# Patient Record
Sex: FEMALE | Race: BLACK OR AFRICAN AMERICAN | Employment: UNEMPLOYED | URBAN - METROPOLITAN AREA
[De-identification: names, ages, dates, MRNs, and addresses within clinical notes are randomized per-mention and may not be internally consistent; named-entity substitution may affect disease eponyms.]

---

## 2022-07-04 ENCOUNTER — HOSPITAL ENCOUNTER (EMERGENCY)
Age: 27
Discharge: HOME | End: 2022-07-05
Attending: STUDENT IN AN ORGANIZED HEALTH CARE EDUCATION/TRAINING PROGRAM
Payer: COMMERCIAL

## 2022-07-04 ENCOUNTER — APPOINTMENT (OUTPATIENT)
Dept: GENERAL RADIOLOGY | Age: 27
End: 2022-07-04
Payer: COMMERCIAL

## 2022-07-04 VITALS
RESPIRATION RATE: 17 BRPM | OXYGEN SATURATION: 100 % | SYSTOLIC BLOOD PRESSURE: 158 MMHG | DIASTOLIC BLOOD PRESSURE: 87 MMHG | HEART RATE: 93 BPM | HEIGHT: 68 IN | TEMPERATURE: 99 F | BODY MASS INDEX: 44.41 KG/M2 | WEIGHT: 293 LBS

## 2022-07-04 DIAGNOSIS — S89.91XA RIGHT KNEE INJURY, INITIAL ENCOUNTER: Primary | ICD-10-CM

## 2022-07-04 PROCEDURE — 99283 EMERGENCY DEPT VISIT LOW MDM: CPT

## 2022-07-04 PROCEDURE — 73560 X-RAY EXAM OF KNEE 1 OR 2: CPT

## 2022-07-04 PROCEDURE — 6370000000 HC RX 637 (ALT 250 FOR IP): Performed by: PHYSICIAN ASSISTANT

## 2022-07-04 RX ORDER — METHOCARBAMOL 750 MG/1
750 TABLET, FILM COATED ORAL 4 TIMES DAILY PRN
Qty: 20 TABLET | Refills: 0 | Status: SHIPPED | OUTPATIENT
Start: 2022-07-04

## 2022-07-04 RX ORDER — IBUPROFEN 800 MG/1
800 TABLET ORAL EVERY 8 HOURS PRN
Qty: 20 TABLET | Refills: 0 | Status: SHIPPED | OUTPATIENT
Start: 2022-07-04

## 2022-07-04 RX ORDER — METHOCARBAMOL 500 MG/1
750 TABLET, FILM COATED ORAL ONCE
Status: COMPLETED | OUTPATIENT
Start: 2022-07-04 | End: 2022-07-04

## 2022-07-04 RX ORDER — IBUPROFEN 400 MG/1
800 TABLET ORAL ONCE
Status: COMPLETED | OUTPATIENT
Start: 2022-07-04 | End: 2022-07-04

## 2022-07-04 RX ADMIN — IBUPROFEN 800 MG: 400 TABLET, FILM COATED ORAL at 22:50

## 2022-07-04 RX ADMIN — METHOCARBAMOL 750 MG: 500 TABLET ORAL at 22:50

## 2022-07-04 ASSESSMENT — PAIN SCALES - GENERAL
PAINLEVEL_OUTOF10: 8
PAINLEVEL_OUTOF10: 8

## 2022-07-04 ASSESSMENT — PAIN DESCRIPTION - PAIN TYPE: TYPE: ACUTE PAIN

## 2022-07-04 ASSESSMENT — PAIN DESCRIPTION - DESCRIPTORS: DESCRIPTORS: SHARP

## 2022-07-04 ASSESSMENT — PAIN DESCRIPTION - LOCATION
LOCATION: KNEE
LOCATION: KNEE

## 2022-07-04 ASSESSMENT — PAIN DESCRIPTION - FREQUENCY: FREQUENCY: CONTINUOUS

## 2022-07-04 ASSESSMENT — PAIN DESCRIPTION - ORIENTATION
ORIENTATION: RIGHT
ORIENTATION: RIGHT

## 2022-07-04 ASSESSMENT — PAIN - FUNCTIONAL ASSESSMENT
PAIN_FUNCTIONAL_ASSESSMENT: 0-10
PAIN_FUNCTIONAL_ASSESSMENT: PREVENTS OR INTERFERES SOME ACTIVE ACTIVITIES AND ADLS

## 2022-07-04 ASSESSMENT — PAIN DESCRIPTION - ONSET: ONSET: PROGRESSIVE

## 2022-07-05 NOTE — ED PROVIDER NOTES
[address] [facility]        Pt Name: Krupa Guzman  MRN: [id number]  [place] 1995  Date of evaluation: 2022  Provider: ISABELLA Short  PCP: Matteo Biswas MD  Note Started: 11:10 PM EDT     The ED Attending Physician was available for consultation but did not see or evaluate this patient. CHIEF COMPLAINT       Chief Complaint   Patient presents with    Knee Pain     right knee pain after slipping right leg was caught underneath her when she fell       HISTORY OF PRESENT ILLNESS   (Location, Timing/Onset, Context/Setting, Quality, Duration, Modifying Factors, Severity, Associated Signs and Symptoms)  Note limiting factors. Chief Complaint: Severe right knee pain    Krupa Guzman is a 32 y.o. female who presents via EMS with complaint of severe right knee pain. She says she was watching fireworks today and at some point fell to the ground, she says her right knee twisted beneath her, but there was no significant traumatic impact. She says the pain in the knee was severe, and she is now unable to bear weight on the knee or even bend. She says it is even painful at rest but much more when trying to move it. Denies any lacerations or any swelling. Says it is a tingling sensation in the lower leg but not complete numbness. Denies any prior history of problems with the knee. Denies injury to any other parts of the body. Nursing Notes were all reviewed and agreed with or any disagreements were addressed in the HPI. REVIEW OF SYSTEMS    (2-9 systems for level 4, 10 or more for level 5)     Review of Systems    Positives and pertinent negatives as per HPI.      PAST MEDICAL HISTORY     Past Medical History:   Diagnosis Date    Diabetes mellitus ([address]     Endometriosis     Hypertension        SURGICAL HISTORY     Past Surgical History:   Procedure Laterality Date    INDUCED  CURRENTMEDICATIONS       Previous Medications    ACETAMINOPHEN (TYLENOL) 325 MG TABLET    Take 650 mg by mouth every 6 hours as needed for Pain    FAMOTIDINE (PEPCID) 20 MG TABLET    Take 1 tablet by mouth 2 times daily    HYDROCHLOROTHIAZIDE (MICROZIDE) 12.5 MG CAPSULE    Take 25 mg by mouth every morning     METFORMIN (GLUCOPHAGE-XR) 500 MG EXTENDED RELEASE TABLET    Take 2 tablets by mouth daily (with breakfast)    ONDANSETRON (ZOFRAN ODT) 4 MG DISINTEGRATING TABLET    Take 1-2 tablets by mouth every 8 hours as needed for Nausea May Sub regular tablet (non-ODT) if insurance does not cover ODT. ONDANSETRON (ZOFRAN) 4 MG TABLET    Take 4 mg by mouth every 12 hours as needed for Nausea or Vomiting    PAROXETINE (PAXIL) 20 MG TABLET    Take 20 mg by mouth every morning       ALLERGIES     Bee venom    FAMILYHISTORY     History reviewed. No pertinent family history. SOCIAL HISTORY       Social History     Tobacco Use    Smoking status: Never Smoker    Smokeless tobacco: Never Used   Substance Use Topics    Alcohol use: Yes    Drug use: Not on file       SCREENINGS    Fabio Coma Scale  Eye Opening: Spontaneous  Best Verbal Response: Oriented  Best Motor Response: Obeys commands  [place] Coma Scale Score: 15      PHYSICAL EXAM    (up to 7 for level 4, 8 or more for level 5)     ED Triage Vitals   BP Temp Temp Source Heart Rate Resp SpO2 Height Weight   07/04/22 2238 07/04/22 2232 07/04/22 2232 07/04/22 2238 07/04/22 2232 07/04/22 2232 07/04/22 2233 07/04/22 2233   (!) 158/87 99 °F (37.2 °C) Oral 93 17 100 % 5' 8\" (1.727 m) (!) 358 lb 4 oz (162.5 kg)       Physical Exam  Vitals and nursing note reviewed. Constitutional:       General: She is not in acute distress. Appearance: Normal appearance. She is obese. She is not ill-appearing. HENT:      Head: Normocephalic and atraumatic. Nose: Nose normal.   Eyes:      General:         Right eye: No discharge. Left eye: No discharge. Pulmonary:      Effort: Pulmonary effort is normal. No respiratory distress. Musculoskeletal:         General: Normal range of motion. Cervical back: Normal range of motion. Comments: Significant tenderness to light palpation diffusely throughout the area of the right knee, with patient tearful in pain with any attempts of range of motion to the right knee. No evident swelling, bruising or laceration. 2+ dorsalis pedis pulse on the right. Sensation to light touch grossly intact and capillary refill <3 seconds in the digits of the right lower extremity. Skin:     General: Skin is warm and dry. Neurological:      General: No focal deficit present. Mental Status: She is alert and oriented to person, place, and time. Psychiatric:         Mood and Affect: Mood normal.         Behavior: Behavior normal.         DIAGNOSTIC RESULTS   LABS:    Labs Reviewed - No data to display    When ordered only abnormal lab results are displayed. All other labs were within normal range or not returned as of this dictation. EKG: When ordered, EKG's are interpreted by the Emergency Department Physician in the absence of a cardiologist.  Please see their note for interpretation of EKG. RADIOLOGY:   Non-plain film images such as CT, Ultrasound and MRI are read by the radiologist. Plain radiographic images are visualized and preliminarily interpreted by the ED Provider with the below findings:    Interpretation per the Radiologist below, if available at the time of this note:    XR KNEE RIGHT (1-2 VIEWS)   Final Result   Small suprapatellar effusion with no acute bony abnormality. CONSULTS:  None    PROCEDURES   Unless otherwise noted below, none.      Procedures    EMERGENCY DEPARTMENT COURSE and DIFFERENTIAL DIAGNOSIS/MDM:   Vitals:    Vitals:    07/04/22 2232 07/04/22 2233 07/04/22 2238   BP:   (!) 158/87   Pulse:   93   Resp: 17     Temp: 99 °F (37.2 °C)     TempSrc: Oral     SpO2: 100% Weight:  (!) 358 lb 4 oz (162.5 kg)    Height:  5' 8\" (1.727 m)        Patient was given the following medications:  Medications   ibuprofen (ADVIL;MOTRIN) tablet 800 mg (800 mg Oral Given 7/4/22 2250)   methocarbamol (ROBAXIN) tablet 750 mg (750 mg Oral Given 7/4/22 2250)           Is this patient to be included in the SEP-1 Core Measure due to severe sepsis or septic shock? No   Exclusion criteria - the patient is NOT to be included for SEP-1 Core Measure due to: Infection is not suspected    Patient was in significant pain in the ED. Good neurovascular status in the leg. X-ray showed a small effusion but no acute bony abnormality, however there is suspicion for significant ligament injury. No indication for hospital admission at this time. Patient was given a knee immobilizer and crutches in the ED. She declined narcotic medication. She will be discharged with prescriptions for anti-inflammatory medication and a muscle relaxant, and she will be given referral for orthopedic follow-up care. The patient verbalized understanding and agreement with this plan of care. The patient was advised to return to the emergency department if symptoms should significantly worsen or if new and concerning symptoms should appear. I estimate there is LOW risk for UNSTABLE FRACTURE, COMPARTMENT SYNDROME, DEEP VENOUS THROMBOSIS, SEPTIC ARTHRITIS, NEUROVASCULAR COMPROMISE, or TENDON/LIGAMENT RUPTURE, thus I consider the discharge disposition reasonable. CRITICAL CARE TIME   None    FINAL IMPRESSION      1.  Right knee injury, initial encounter          DISPOSITION/PLAN   DISPOSITION Decision To Discharge 07/04/2022 11:35:44 PM      PATIENT REFERRED TO:  Melissa Olmstead MD  [address] [address]  [phone]    Schedule an appointment as soon as possible for a visit   for orthopedic follow-up care      DISCHARGE MEDICATIONS:  New Prescriptions    IBUPROFEN (ADVIL;MOTRIN) 800 MG TABLET Take 1 tablet by mouth every 8 hours as needed for Pain    METHOCARBAMOL (ROBAXIN-750) 750 MG TABLET    Take 1 tablet by mouth 4 times daily as needed (pain)       DISCONTINUED MEDICATIONS:  Discontinued Medications    No medications on file            (Please note that portions of this note were completed with a voice recognition program.  Efforts were made to edit the dictations but occasionally words are mis-transcribed.)    ISABELLA Romero (electronically signed)       Roman Dillon, [address]  07/04/22 3061

## 2022-07-06 ENCOUNTER — TELEPHONE (OUTPATIENT)
Dept: ORTHOPEDIC SURGERY | Age: 27
End: 2022-07-06

## 2022-07-06 ENCOUNTER — OFFICE VISIT (OUTPATIENT)
Dept: ORTHOPEDIC SURGERY | Age: 27
End: 2022-07-06
Payer: COMMERCIAL

## 2022-07-06 VITALS — BODY MASS INDEX: 44.41 KG/M2 | WEIGHT: 293 LBS | HEIGHT: 68 IN

## 2022-07-06 DIAGNOSIS — S83.412A SPRAIN OF MEDIAL COLLATERAL LIGAMENT OF LEFT KNEE, INITIAL ENCOUNTER: ICD-10-CM

## 2022-07-06 DIAGNOSIS — S83.241A ACUTE MEDIAL MENISCUS TEAR OF RIGHT KNEE, INITIAL ENCOUNTER: Primary | ICD-10-CM

## 2022-07-06 PROCEDURE — G8427 DOCREV CUR MEDS BY ELIG CLIN: HCPCS | Performed by: ORTHOPAEDIC SURGERY

## 2022-07-06 PROCEDURE — 99203 OFFICE O/P NEW LOW 30 MIN: CPT | Performed by: ORTHOPAEDIC SURGERY

## 2022-07-06 PROCEDURE — G8417 CALC BMI ABV UP PARAM F/U: HCPCS | Performed by: ORTHOPAEDIC SURGERY

## 2022-07-06 NOTE — PROGRESS NOTES
Dominic Salas  [id number]  July 6, 2022    Chief Complaint   Patient presents with    Knee Pain     Right       History: The patient is a 60-year-old female who is here for evaluation of her right knee. The patient reportedly was walking on wet grass and she slipped and fell and her right leg went behind her. She attempted to get up and then fell again. She immediately noted right knee pain and swelling. The injury occurred on 7/4/2022. She has no prior history of knee injury. She has been taking ibuprofen on a regular basis. She has been wearing her knee immobilizer. This is a consult from [facility] for right knee pain and swelling. The patient's  past medical history, medications, allergies,  family history, social history, and have been reviewed, and dated and are recorded in the chart. Pertinent items are noted in HPI. Review of systems reviewed from Pertinent History Form dated on 7/6/22 and available in the patient's chart under the Media tab. Vitals:  Ht 5' 8\" (1.727 m)   Wt (!) 358 lb (162.4 kg)   LMP 06/19/2022   BMI 54.43 kg/m²     Physical: Ms. Dominic Salas appears well, she is in no apparent distress, she demonstrates appropriate mood & affect. She is morbidly obese. She is alert and oriented to person, place and time. Examination of the right lower extremity reveals no pain with range of motion of the hip. She has generalized tenderness to palpation about the joint line of the right knee. Range of motion is from -3 degrees to 90 degrees. Strength is 4+ to 5/5 for all muscle groups about the right knee. There is patellofemoral crepitus with range of motion of the right knee. Varus and valgus stressing of the knees reveals no evidence of instability. The patient does have moderate pain in the right knee with valgus stressing. There is a medium effusion in the right knee. Anterior drawer and Lachman are negative bilaterally.   Palpation of the knee reveals an intact patellar tendon and quadriceps tendon. She is able to perform a straight leg raise. Examination of the skin reveals no rashes, ulceration, or lesion, bilaterally in the lower extremities. Sensation to both lower extremities is grossly intact. Exam of both feet reveals pedal pulses intact and brisk cap refill. Patient is able to dorsiflex and wiggle all toes. Deep tendon reflexes of the lower extremities are normal and symmetric. X-rays: 2 views of the right knee obtained in the emergency room were extensively reviewed. There is no evidence of fracture or dislocation. There are no lytic or blastic lesions within the bone. Impression: Right knee sprain rule out medial meniscal tear    Plan: At this time, we will obtain an MRI scan of the right knee. I am concerned about the medial meniscus and medial collateral ligament. The patient was encouraged to work on light range of motion. She will ice the knee is much as possible. She may take ibuprofen on a regular basis. She will follow-up with me after the MRI scan is completed. No orders of the defined types were placed in this encounter.

## 2022-07-06 NOTE — TELEPHONE ENCOUNTER
Aracelis Zelaya  P [facility] Clinical Support    MRI APPROVED Alessandra Amaya # [id number] - VALID TO 09/04/22 - @ [facility]     Was placed at [facility] due to her insurance preferring free standing facility. We recieved Ins Auth for MRI. If MRI is for [facility], order/auth has been faxed. Patient will call to schedule the MRI, then call our office back to make a follow up appt, to go over the results. I left a detailed VM letting pt know.

## 2022-07-13 ENCOUNTER — TELEPHONE (OUTPATIENT)
Dept: ORTHOPEDIC SURGERY | Age: 27
End: 2022-07-13

## 2022-07-13 NOTE — TELEPHONE ENCOUNTER
I spoke with Michelle Moses at [facility] and let her know we only received the sagittal images of MRI. She will push all images.

## 2022-07-14 ENCOUNTER — OFFICE VISIT (OUTPATIENT)
Dept: ORTHOPEDIC SURGERY | Age: 27
End: 2022-07-14
Payer: COMMERCIAL

## 2022-07-14 VITALS — WEIGHT: 293 LBS | BODY MASS INDEX: 44.41 KG/M2 | RESPIRATION RATE: 16 BRPM | HEIGHT: 68 IN

## 2022-07-14 DIAGNOSIS — S83.511A RUPTURE OF ANTERIOR CRUCIATE LIGAMENT OF RIGHT KNEE, INITIAL ENCOUNTER: Primary | ICD-10-CM

## 2022-07-14 DIAGNOSIS — S83.281A ACUTE LATERAL MENISCAL TEAR, RIGHT, INITIAL ENCOUNTER: ICD-10-CM

## 2022-07-14 DIAGNOSIS — S83.241A ACUTE MEDIAL MENISCAL TEAR, RIGHT, INITIAL ENCOUNTER: ICD-10-CM

## 2022-07-14 PROCEDURE — 1036F TOBACCO NON-USER: CPT | Performed by: ORTHOPAEDIC SURGERY

## 2022-07-14 PROCEDURE — G8417 CALC BMI ABV UP PARAM F/U: HCPCS | Performed by: ORTHOPAEDIC SURGERY

## 2022-07-14 PROCEDURE — 99214 OFFICE O/P EST MOD 30 MIN: CPT | Performed by: ORTHOPAEDIC SURGERY

## 2022-07-14 PROCEDURE — G8428 CUR MEDS NOT DOCUMENT: HCPCS | Performed by: ORTHOPAEDIC SURGERY

## 2022-07-14 NOTE — PROGRESS NOTES
Manuela Zavala  [id number]  July 14, 2022    Chief Complaint   Patient presents with    Follow-up     Right knee, MRI results. History: The patient is a 42-year-old female who is here for evaluation of her right knee. The patient reportedly was walking on wet grass and she slipped and fell and her right leg went behind her. She attempted to get up and then fell again. She is here to review her MRI results her symptoms have improved significantly. She is having much less pain. The patient's  past medical history, medications, allergies,  family history, social history, and have been reviewed, and dated and are recorded in the chart. Pertinent items are noted in HPI. Review of systems reviewed from Pertinent History Form dated on 7/6/22 and available in the patient's chart under the Media tab. Vitals:  Resp 16   Ht 5' 8\" (1.727 m)   Wt (!) 358 lb (162.4 kg) Comment: patient in to much pain to stand on leg to long  LMP 06/19/2022   BMI 54.43 kg/m²     Physical: Ms. Manuela Zavala appears well, she is in no apparent distress, she demonstrates appropriate mood & affect. She is morbidly obese. She is alert and oriented to person, place and time. Examination of the right lower extremity reveals no pain with range of motion of the hip. She has generalized tenderness to palpation about the medial and lateral joint line. Range of motion is from -3 degrees to 100 degrees. Strength is 4+ to 5/5 for all muscle groups about the right knee. There is patellofemoral crepitus with range of motion of the right knee. Varus and valgus stressing of the knees reveals no evidence of instability. The patient does have moderate pain in the right knee with valgus stressing. There is a medium effusion in the right knee. Anterior drawer and Lachman are negative bilaterally. Palpation of the knee reveals an intact patellar tendon and quadriceps tendon.   She is able to perform a straight leg raise.  Examination of the skin reveals no rashes, ulceration, or lesion, bilaterally in the lower extremities. Sensation to both lower extremities is grossly intact. Exam of both feet reveals pedal pulses intact and brisk cap refill. Patient is able to dorsiflex and wiggle all toes. Deep tendon reflexes of the lower extremities are normal and symmetric. X-rays: MRI of the right knee was extensively reviewed. The patient has evidence of a complete rupture of the ACL. There is also evidence of a complex medial meniscal tear involving the body and posterior horn. There is a vertical tear involving the lateral meniscus. Impression: Right knee ACL tear #2 right knee medial meniscal tear #3 right knee lateral meniscal tear      Plan: At this time, we will attempt to treat this nonsurgically. We will get her into a therapy program.  She will work on range of motion and strengthening. She will concentrate on hamstring strengthening. She may weight-bear as tolerated. She will gradually discontinue crutches. She will follow-up with me in 2 to 3 weeks. If she continues to be severely painful, we certainly could consider arthroscopy to address the meniscus tears. I do not feel she is a great surgical candidate for ACL reconstruction. No orders of the defined types were placed in this encounter.

## 2022-07-18 ENCOUNTER — HOSPITAL ENCOUNTER (OUTPATIENT)
Dept: PHYSICAL THERAPY | Age: 27
Setting detail: THERAPIES SERIES
Discharge: HOME | End: 2022-07-18
Payer: COMMERCIAL

## 2022-07-18 PROCEDURE — 97016 VASOPNEUMATIC DEVICE THERAPY: CPT

## 2022-07-18 PROCEDURE — 97530 THERAPEUTIC ACTIVITIES: CPT

## 2022-07-18 PROCEDURE — 97110 THERAPEUTIC EXERCISES: CPT

## 2022-07-18 PROCEDURE — 97161 PT EVAL LOW COMPLEX 20 MIN: CPT

## 2022-07-18 NOTE — PLAN OF CARE
[facility], [facility]  [address] [place], [facility]  Phone: [phone]   Fax:     [phone]                                                       Physical Therapy Certification    Dear Referring Provider (secondary): Carmen Hayes MD    We had the pleasure of evaluating the following patient for physical therapy services at [facility]. A summary of our findings can be found in the initial assessment below. This includes our plan of care. If you have any questions or concerns regarding these findings, please do not hesitate to contact me at the office phone number checked above.   Thank you for the referral.       Physician Signature:_______________________________Date:__________________  By signing above (or electronic signature), therapists plan is approved by physician              Patient: Vassie Lanes   : 1995   MRN: [id number]  Referring Physician: Referring Provider (secondary): Carmen Hayes MD      Evaluation Date: 2022      Medical Diagnosis Information:  Diagnosis: I59.461E (ICD-10-CM) - Rupture of anterior cruciate ligament of right knee, initial encounter  S83.241A (ICD-10-CM) - Acute medial meniscal tear, right, initial encounter  S83.281A (ICD-10-CM) - Acute lateral meniscal tear, right, initial encounter   Treatment Diagnosis: subacute knee sprain, limited mobility, function                                         Insurance information:       Precautions/ Contra-indications:   Latex Allergy:  [x]NO      []YES  Preferred Language for Healthcare:   [x]English       []other:    C-SSRS Triggered by Intake questionnaire (Past 2 wk assessment ):   [x] No, Questionnaire did not trigger screening.   [] Yes, Patient intake triggered C-SSRS Screening      [] C-SSRS Screening completed  [] PCP notified via Epic SUBJECTIVE: Patient stated complaint: Pt here for evaluation R knee sprain which occurred on 7/4/22. She slipped and fell on wet grass with her knee going under/ behind her. She tried to get up, but felt a few pops. She had ortho visit and MRI showed ACL and med/ lat meniscus tears. She has f/u w/ ortho in appro x3 wks. At this time, the plan is to treat conservatively. She just finished her masters in [address] [place] [address] and also owns a [employer]. 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 Today she reports using bilat crutches with toe touch weight bearing, no brace in place. She states she was given an immobilizer, but has not been using it for the past week because it wouldn't stay put and she is eager to regain her function. Knee MRI:   CONCLUSION:   1. Acute midsubstance ACL tear. Portions of the ACL are buckled on itself anteriorly, which may    give rise to catching or locking clinically. 2. 3-4 cm trizonal tear posterior horn-body junction medial meniscus. 3. Vertical tear posterior horn lateral meniscus. Wrisberg rip component present. Displaced    meniscal root fragment into the notch favored over prominent intrameniscal ligament mimicking    displaced flap at the root. 4. Low-grade MCL and fibular collateral ligament sprain. Diffuse muscle and myofascial and    capsular sprain surrounding the knee. This is most conspicuous posteriorly and    anterolaterally. 5. Complex effusion. Hemarthrosis present. Coagulated blood products noted within the joint. 6. At least grade 2-3 chondral irregularity central and lateral patellar cartilage.      Relevant Medical History:   Functional Scale/Score: FOTO = 29     Pain Scale: 1-5/10  Easing factors: rest, keeping knee bent, avoiding WBing  Provocative factors: walking, moving knee too much     Type: [x]Constant   []Intermittent (I10)  []Hyperlipidemia (E78.5)  []Angina pectoris (I20)  []Atherosclerosis (I70)  []CVA Musculoskeletal conditions   []Disc pathology   []Congenital spine pathologies   []Prior surgical intervention  []Osteoporosis (M81.8)  []Osteopenia (M85.8)   Endocrine conditions   []Hypothyroid (E03.9)  []Hyperthyroid Gastrointestinal conditions   []Constipation (G32.65)   Metabolic conditions   []Morbid obesity (E66.01)  []Diabetes type 1(E10.65) or 2 (E11.65)   []Neuropathy (G60.9)     Pulmonary conditions   []Asthma (J45)  []Coughing   []COPD (J44.9)   Psychological Disorders  []Anxiety (F41.9)  []Depression (F32.9)   []Other:   []Other:          Barriers to/and or personal factors that will affect rehab potential:              []Age  []Sex    []Smoker              []Motivation/Lack of Motivation                        [x]Co-Morbidities              []Cognitive Function, education/learning barriers              [x]Environmental, home barriers              [x]profession/work barriers  []past PT/medical experience  []other:  Justification:     Falls Risk Assessment (30 days):   [x] Falls Risk assessed and no intervention required.   [] Falls Risk assessed and Patient requires intervention due to being higher risk   TUG score (>12s at risk):     [] Falls education provided, including         ASSESSMENT:   Functional Impairments:     []Noted lumbar/proximal hip/LE hypomobility   [x]Decreased LE functional ROM   [x]Decreased core/proximal hip strength and neuromuscular control   [x]Decreased LE functional strength   [x]Reduced balance/proprioceptive control   []other:      Functional Activity Limitations (from functional questionnaire and intake)   [x]Reduced ability to tolerate prolonged functional positions   [x]Reduced ability or difficulty with changes of positions or transfers between positions   []Reduced ability to maintain good posture and demonstrate good body mechanics with sitting, bending, and lifting   []Reduced ability to sleep   [x] Reduced ability or tolerance with driving and/or computer work   [x]Reduced ability to perform lifting, carrying tasks   [x]Reduced ability to squat   []Reduced ability to forward bend   [x]Reduced ability to ambulate prolonged functional periods/distances/surfaces   [x]Reduced ability to ascend/descend stairs   [x]Reduced ability to run, hop or jump   []other:     Participation Restrictions   []Reduced participation in self care activities   [x]Reduced participation in home management activities   [x]Reduced participation in work activities   [x]Reduced participation in social activities. []Reduced participation in sport activities. Classification :    []Signs/symptoms consistent with post-surgical status including decreased ROM, strength and function.    [x]Signs/symptoms consistent with joint sprain/strain   []Signs/symptoms consistent with patella-femoral syndrome   []Signs/symptoms consistent with knee OA/hip OA   [x]Signs/symptoms consistent with internal derangement of knee/Hip   [x]Signs/symptoms consistent with functional hip weakness/NMR control      []Signs/symptoms consistent with tendinitis/tendinosis    []signs/symptoms consistent with pathology which may benefit from Dry needling      []other:      Prognosis/Rehab Potential:      []Excellent   [x]Good    []Fair   []Poor    Tolerance of evaluation/treatment:    []Excellent   [x]Good    []Fair   []Poor    Physical Therapy Evaluation Complexity Justification  [x] A history of present problem with:  [] no personal factors and/or comorbidities that impact the plan of care;  [x]1-2 personal factors and/or comorbidities that impact the plan of care  []3 personal factors and/or comorbidities that impact the plan of care  [x] An examination of body systems using standardized tests and measures addressing any of the following: body structures and functions (impairments), activity limitations, and/or participation restrictions;:  [x] a total of 1-2 or more elements   [] a total of 3 or more elements   [] a total of 4 or more elements   [x] A clinical presentation with:  [] stable and/or uncomplicated characteristics   [x] evolving clinical presentation with changing characteristics  [] unstable and unpredictable characteristics;   [x] Clinical decision making of [] low, [] moderate, [] high complexity using standardized patient assessment instrument and/or measurable assessment of functional outcome. [x] EVAL (LOW) 86277 (typically 20 minutes face-to-face)  [] EVAL (MOD) 27786 (typically 30 minutes face-to-face)  [] EVAL (HIGH) 20944 (typically 45 minutes face-to-face)  [] RE-EVAL     PLAN:  Frequency/Duration:  up to 2 days per week for up to 6-12 Weeks:  Interventions:  [x]  Therapeutic exercise including: strength training, ROM, for Lower extremity and core   [x]  NMR activation and proprioception for LE, Glutes and Core   [x]  Manual therapy as indicated for LE, Hip and spine to include: Dry Needling/IASTM, STM, PROM, Gr I-IV mobilizations, manipulation. [x] Modalities as needed that may include: thermal agents, E-stim, Biofeedback, US, iontophoresis as indicated  [x] Patient education on joint protection, postural re-education, activity modification, progression of HEP. [x] Aquatic exercise including: strength training, ROM, and balance for Lower extremity and core     HEP instruction:   7/18: QS, AAROM: supine and seated, ST TKE, gait practice w/ bilat crutches and knee brace    GOALS:  Patient stated goal: able to ambulate community distances w/o AD. [] Progressing: [] Met: [] Not Met: [] Adjusted    Therapist goals for Patient:   Short Term Goals: To be achieved in: 2 weeks  1. Independent in HEP and progression per patient tolerance, in order to prevent re-injury. [] Progressing: [] Met: [] Not Met: [] Adjusted  2.  Patient will have a decrease in pain to facilitate improvement in movement, function, and ADLs as indicated by Functional Deficits. [] Progressing: [] Met: [] Not Met: [] Adjusted    Long Term Goals: To be achieved in: up to 12 weeks  1. Increase FOTO functional outcome score from 29 to 61 to assist with reaching prior level of function. [] Progressing: [] Met: [] Not Met: [] Adjusted  2. Patient will demonstrate increased AROM to symmetrical to allow for proper joint functioning as indicated by patients Functional Deficits. [] Progressing: [] Met: [] Not Met: [] Adjusted  3. Patient will demonstrate an increase in Strength to good proximal hip strength and control, within 5lb HHD in LE to allow for proper functional mobility as indicated by patients Functional Deficits. [] Progressing: [] Met: [] Not Met: [] Adjusted  4. Patient will return to all functional activities without increased symptoms or restriction. [] Progressing: [] Met: [] Not Met: [] Adjusted  5. Able to work full time w/o knee giving way. [] Progressing: [] Met: [] Not Met: [] Adjusted     Electronically signed by: Kayce Bright PT , DPT, OCS #[id number]          Note: If patient does not return for scheduled/recommended follow up visits, this note will serve as a discharge from care along with the most recent update on progress.

## 2022-07-18 NOTE — FLOWSHEET NOTE
[facility] - [facility], [facility]  [address] [facility], [facility]  Phone: [phone]   Fax:     [phone]      Physical Therapy Treatment Note/ Progress Report:     Date:  2022    Patient Name:  Mee Espinoza    :  1995  MRN: [id number]    Pertinent Medical History:     Medical/Treatment Diagnosis Information:  Diagnosis: P76.423L (ICD-10-CM) - Rupture of anterior cruciate ligament of right knee, initial encounter  S83.241A (ICD-10-CM) - Acute medial meniscal tear, right, initial encounter  S83.281A (ICD-10-CM) - Acute lateral meniscal tear, right, initial encounter  Treatment Diagnosis: subacute knee sprain, limited mobility, function    Insurance/Certification information:     Physician Information:  Referring Provider (secondary): Lissy Torres MD  Plan of care signed (Y/N):     Date of Patient follow up with Physician:      Progress Report: []  Yes  [x]  No     Date Range for reporting period:  Beginnin2022  Ending:      Progress report due (10 Rx/or 30 days whichever is less):      Recertification due (POC duration/ or 90 days whichever is less):      Visit # POC/Insurance Allowable Auth Needed   1 caresource []Yes   []No     Latex Allergy:  [x]NO      []YES  Preferred Language for Healthcare:   [x]English       []Other:    Functional Scale:       Date assessed: at eval  Test:FOTO  Score:    Pain level:  /10     History of Injury: Pt here for evaluation R knee sprain which occurred on 22. She slipped and fell on wet grass with her knee going under/ behind her. She tried to get up, but felt a few pops. She had ortho visit and MRI showed ACL and med/ lat meniscus tears. She has f/u w/ ortho in appro x3 wks. At this time, the plan is to treat conservatively. 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 Today she reports using bilat crutches with toe touch weight bearing, no brace in place. She states she was given an immobilizer, but has not been using it for the past week because it wouldn't stay put and she is eager to regain her function. Knee MRI:   CONCLUSION:   1. Acute midsubstance ACL tear. Portions of the ACL are buckled on itself anteriorly, which may    give rise to catching or locking clinically. 2. 3-4 cm trizonal tear posterior horn-body junction medial meniscus. 3. Vertical tear posterior horn lateral meniscus. Wrisberg rip component present. Displaced   meniscal root fragment into the notch favored over prominent intrameniscal ligament mimicking   displaced flap at the root. 4. Low-grade MCL and fibular collateral ligament sprain. Diffuse muscle and myofascial and   capsular sprain surrounding the knee. This is most conspicuous posteriorly and   anterolaterally. 5. Complex effusion. Hemarthrosis present. Coagulated blood products noted within the joint. 6. At least grade 2-3 chondral irregularity central and lateral patellar cartilage. SUBJECTIVE:  See eval    OBJECTIVE:  Observation:   Test measurements:      RESTRICTIONS/PRECAUTIONS: acute ACL + meniscus tears    Exercises/Interventions:     Therapeutic Ex (07405)   Min: 8' Reps/Resistance Notes/CUES        AAROM Seated x 8 May try supine heel slides w/ belt in future   Heel prop >pt edu         Hip Abd > Sidely? Hip ext > Prone?    LAQ >    Knee ext isometric > future        ST heel toe raise >pt edu              Pt edu HEP review    Therapeutic Activity (01154) Min: 15'     Pt edu Pathology, prognosis, early rehab goals    Gait Gait w/ bilat crutches Cues for foot flat, TKE, step thru pattern                       NMR re-education (19993)  Min: 4'  CUES NEEDED        QS Towel roll under knee; 5s x 8 SLR >future                        Manual Intervention (69304) Min: 6'     Knee manual Knee PROM flex/ext Add hip PROM, HS str per kathleen                       Modalities  Min: 10'     vaso 10' Leg on bolster; low compression   BFR > Future after a few sessions   NMR estim >future      Other Therapeutic Activities: Pt was educated on PT POC, Diagnosis, Prognosis, pathomechanics as well as frequency and duration of scheduling future physical therapy appointments. Time was also taken on this day to answer all patient questions and participation in PT. Reviewed appointment policy in detail with patient and patient verbalized understanding. Home Exercise Program: Patient was instructed in the following for HEP:       7/18: QS, AAROM: supine and seated, ST TKE, gait practice w/ bilat crutches and knee brace      . Patient verbalized/demonstrated understanding and was issued written handout. Therapeutic Exercise and NMR EXR  [x] (31729) Provided verbal/tactile cueing for activities related to strengthening, flexibility, endurance, ROM for improvements in LE, proximal hip, and core control with self care, mobility, lifting, ambulation. [x] (81736) Provided verbal/tactile cueing for activities related to improving balance, coordination, kinesthetic sense, posture, motor skill, proprioception  to assist with LE, proximal hip, and core control in self care, mobility, lifting, ambulation and eccentric single leg control. [address] and Therapeutic Activities:    [x] (01396 or 41174) Provided verbal/tactile cueing for activities related to improving balance, coordination, kinesthetic sense, posture, motor skill, proprioception and motor activation to allow for proper function of core, proximal hip and LE with self care and ADLs and functional mobility.    [x] (05483) Gait Re-education- Provided training and instruction to the patient for proper LE, core and proximal hip recruitment and positioning and eccentric body weight control with ambulation re-education including up and down stairs     Home Exercise Program:    [x] (30325) Reviewed/Progressed HEP activities related to strengthening, flexibility, endurance, ROM of core, proximal hip and LE for functional self-care, mobility, lifting and ambulation/stair navigation   [x] (32684)Reviewed/Progressed HEP activities related to improving balance, coordination, kinesthetic sense, posture, motor skill, proprioception of core, proximal hip and LE for self care, mobility, lifting, and ambulation/stair navigation      Manual Treatments:  PROM / STM / Oscillations-Mobs:  G-I, II, III, IV (PA's, Inf., Post.)  [x] (59280) Provided manual therapy to mobilize LE, proximal hip and/or LS spine soft tissue/joints for the purpose of modulating pain, promoting relaxation,  increasing ROM, reducing/eliminating soft tissue swelling/inflammation/restriction, improving soft tissue extensibility and allowing for proper ROM for normal function with self care, mobility, lifting and ambulation. Approval Dates:  CPT Code Units Approved Units Used  Date Updated:                     Charges:  Timed Code Treatment Minutes: 33   Total Treatment Minutes: 53      [x] EVAL (LOW) 67454 (typically 20 minutes face-to-face)  [] EVAL (MOD) 62632 (typically 30 minutes face-to-face)  [] EVAL (HIGH) 15248 (typically 45 minutes face-to-face)  [] RE-EVAL     [x] HH(40541) x     [] Dry needle 1 or 2 Muscles (99508)  [] NMR (83353) x     [] Dry needle 3+ Muscles (31817)  [] Manual (70733) x     [] Ultrasound (14549) x  [x] TA (00036) x     [] Mech Traction (17847)  [] ES(attended) (29233)     [] ES (un) (30094):   [x] Vasopump (96438) [] Ionto (59203)   [] Other:    Khoi Gaines stated goal: able to ambulate community distances w/o AD. [] Progressing: [] Met: [] Not Met: [] Adjusted    Therapist goals for Patient:   Short Term Goals: To be achieved in: 2 weeks  1.  Independent in HEP and progression per patient

## 2022-07-21 ENCOUNTER — HOSPITAL ENCOUNTER (OUTPATIENT)
Dept: PHYSICAL THERAPY | Age: 27
Setting detail: THERAPIES SERIES
Discharge: HOME | End: 2022-07-21
Payer: COMMERCIAL

## 2022-07-21 NOTE — FLOWSHEET NOTE
[facility], [facility]  [address] [place], [facility]  Phone: [phone]   Fax:     [phone]    Physical Therapy  Cancellation/No-show Note  Patient Name:  Maribell Ramirez  :  1995   Date:  2022  Cancelled visits to date: 0  No-shows to date: 1    Patient status for today's appointment patient:  []  Cancelled  []  Rescheduled appointment  [x]  No-show     Reason given by patient:  []  Patient ill  []  Conflicting appointment  []  No transportation    []  Conflict with work  []  No reason given  []  Other:     Comments:      Phone call information:   []  Phone call made today to patient at _ time at number provided:      []  Patient answered, conversation as follows:    []  Patient did not answer, message left as follows:  [x]  Phone call not made today    Electronically signed by:   Kayce Bright PT

## 2022-07-21 NOTE — PLAN OF CARE
[address], [facility]  [address] [facility]  Phone: [phone]   Fax:     [phone]    Physical Therapy Prescription    Date: 2022    Patient Name: Krupa Guzman  : 1995  MRN: [id number]    Medical/Treatment Diagnosis Information:  Diagnosis: D78.995J (ICD-10-CM) - Rupture of anterior cruciate ligament of right knee, initial encounter  S83.241A (ICD-10-CM) - Acute medial meniscal tear, right, initial encounter  S83.281A (ICD-10-CM) - Acute lateral meniscal tear, right, initial encounter  Treatment Diagnosis: subacute knee sprain, limited mobility, function    Referring Physician:  Dr. Bandar Bailon MD    Drug Allergies:    With your approval we would like to add the following to the patient's current PT treatment:    [x] BLOOD FLOW RESTRICTION TRAINING   [] TENS Unit        [] [facility] Cervical Traction Unit        [] [facility] Lumbar Traction Unit    [] Sushila Rebollar        [] Rolling/Standard [employer]         [] Iontophoresis: Dexamethasone 4mg/ml injectable-30 ml vial     Quantity: 1 vial for iontophoresis     As needed        Electronically signed by: Naima Aguiar PT , DPT, OCS #[id number]      If you have any questions or concerns, please don't hesitate to call.   Thank you for your referral.    Physician Signature:________________________________Date:__________________  By signing above, therapists plan is approved by physician

## 2022-07-25 ENCOUNTER — HOSPITAL ENCOUNTER (OUTPATIENT)
Dept: PHYSICAL THERAPY | Age: 27
Setting detail: THERAPIES SERIES
Discharge: HOME | End: 2022-07-25
Payer: COMMERCIAL

## 2022-07-25 NOTE — FLOWSHEET NOTE
[facility] [place]  [address] [place]  Phone: [phone]   Fax:     [phone]    Physical Therapy  Cancellation/No-show Note  Patient Name:  Deborah Feng  :  1995   Date:  2022  Cancelled visits to date: 0  No-shows to date: 2    Patient status for today's appointment patient:  []  Cancelled  []  Rescheduled appointment  [x]  No-show     Reason given by patient:  []  Patient ill  []  Conflicting appointment  []  No transportation    []  Conflict with work  []  No reason given  [x]  Other:     Comments:  pt did call later in the day to confirm insurance details; states she will be here for next appmt. Phone call information:   []  Phone call made today to patient at _ time at number provided:      []  Patient answered, conversation as follows:    []  Patient did not answer, message left as follows:  []  Phone call not made today    Electronically signed by:   Clarissa Edwards PT

## 2022-08-02 ENCOUNTER — HOSPITAL ENCOUNTER (OUTPATIENT)
Dept: PHYSICAL THERAPY | Age: 27
Setting detail: THERAPIES SERIES
Discharge: HOME | End: 2022-08-02

## 2022-08-02 NOTE — FLOWSHEET NOTE
[facility] - Outpatient Rehabilitation and Therapy, [facility]  [address] [facility], [facility]  Phone: [phone]   Fax:     [phone]      Physical Therapy Treatment Note/ Progress Report:     Date:  2022    Patient Name:  Javon Sarkar    :  1995  MRN: [id number]    Pertinent Medical History:     Medical/Treatment Diagnosis Information:  Diagnosis: N94.118J (ICD-10-CM) - Rupture of anterior cruciate ligament of right knee, initial encounter  S83.241A (ICD-10-CM) - Acute medial meniscal tear, right, initial encounter  S83.281A (ICD-10-CM) - Acute lateral meniscal tear, right, initial encounter  Treatment Diagnosis: subacute knee sprain, limited mobility, function    Insurance/Certification information:     Physician Information:  Referring Provider (secondary): Kasey Werner MD  Plan of care signed (Y/N):     Date of Patient follow up with Physician:      Progress Report: []  Yes  [x]  No     Date Range for reporting period:  Beginnin2022  Ending:      Progress report due (10 Rx/or 30 days whichever is less): 91     Recertification due (POC duration/ or 90 days whichever is less):      Visit # POC/Insurance Allowable Auth Needed   2 caresource []Yes   []No     Latex Allergy:  [x]NO      []YES  Preferred Language for Healthcare:   [x]English       []Other:    Functional Scale:       Date assessed: at eval  Test:FOTO  Score:    Pain level:  /10     History of Injury: Pt here for evaluation R knee sprain which occurred on 22. She slipped and fell on wet grass with her knee going under/ behind her. She tried to get up, but felt a few pops. She had ortho visit and MRI showed ACL and med/ lat meniscus tears. She has f/u w/ ortho in appro x3 wks. At this time, the plan is to treat conservatively. She just finished her masters in [employer] and also owns a [employer].  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 Today she reports using bilat crutches with toe touch weight bearing, no brace in place. She states she was given an immobilizer, but has not been using it for the past week because it wouldn't stay put and she is eager to regain her function. Knee MRI:   CONCLUSION:   1. Acute midsubstance ACL tear. Portions of the ACL are buckled on itself anteriorly, which may    give rise to catching or locking clinically. 2. 3-4 cm trizonal tear posterior horn-body junction medial meniscus. 3. Vertical tear posterior horn lateral meniscus. Wrisberg rip component present. Displaced   meniscal root fragment into the notch favored over prominent intrameniscal ligament mimicking   displaced flap at the root. 4. Low-grade MCL and fibular collateral ligament sprain. Diffuse muscle and myofascial and   capsular sprain surrounding the knee. This is most conspicuous posteriorly and   anterolaterally. 5. Complex effusion. Hemarthrosis present. Coagulated blood products noted within the joint. 6. At least grade 2-3 chondral irregularity central and lateral patellar cartilage. SUBJECTIVE:  See eval    OBJECTIVE:  Observation:   Test measurements:      RESTRICTIONS/PRECAUTIONS: acute ACL + meniscus tears    Exercises/Interventions:     Therapeutic Ex (36785)   Min: 8' Reps/Resistance Notes/CUES        AAROM Seated x 8 May try supine heel slides w/ belt in future   Heel prop >pt edu         Hip Abd > Sidely? Hip ext > Prone?    LAQ >    Knee ext isometric > future        ST heel toe raise >pt edu              Pt edu HEP review    Therapeutic Activity (17047) Min: 15'     Pt edu Pathology, prognosis, early rehab goals    Gait Gait w/ bilat crutches Cues for foot flat, TKE, step thru pattern                       NMR re-education (30199)  Min: 4'  CUES NEEDED        QS Towel roll under knee; 5s x 8 SLR >future                        Manual Intervention (70736) Min: 6'     Knee manual Knee PROM flex/ext Add hip PROM, HS str per kathleen                       Modalities  Min: 10'     vaso 10' Leg on bolster; low compression   BFR > Future after a few sessions   NMR estim >future      Other Therapeutic Activities: Pt was educated on PT POC, Diagnosis, Prognosis, pathomechanics as well as frequency and duration of scheduling future physical therapy appointments. Time was also taken on this day to answer all patient questions and participation in PT. Reviewed appointment policy in detail with patient and patient verbalized understanding. Home Exercise Program: Patient was instructed in the following for HEP:       7/18: QS, AAROM: supine and seated, ST TKE, gait practice w/ bilat crutches and knee brace      . Patient verbalized/demonstrated understanding and was issued written handout. Therapeutic Exercise and NMR EXR  [x] (24248) Provided verbal/tactile cueing for activities related to strengthening, flexibility, endurance, ROM for improvements in LE, proximal hip, and core control with self care, mobility, lifting, ambulation. [x] (63212) Provided verbal/tactile cueing for activities related to improving balance, coordination, kinesthetic sense, posture, motor skill, proprioception  to assist with LE, proximal hip, and core control in self care, mobility, lifting, ambulation and eccentric single leg control. [address] and Therapeutic Activities:    [x] (92583 or 43564) Provided verbal/tactile cueing for activities related to improving balance, coordination, kinesthetic sense, posture, motor skill, proprioception and motor activation to allow for proper function of core, proximal hip and LE with self care and ADLs and functional mobility.    [x] (25136) Gait Re-education- Provided training and instruction to the patient for proper LE, core and proximal hip recruitment and positioning and eccentric body weight control with ambulation re-education including up and down stairs     Home Exercise Program:    [x] (98523) Reviewed/Progressed HEP activities related to strengthening, flexibility, endurance, ROM of core, proximal hip and LE for functional self-care, mobility, lifting and ambulation/stair navigation   [x] (09061)Reviewed/Progressed HEP activities related to improving balance, coordination, kinesthetic sense, posture, motor skill, proprioception of core, proximal hip and LE for self care, mobility, lifting, and ambulation/stair navigation      Manual Treatments:  PROM / STM / Oscillations-Mobs:  G-I, II, III, IV (PA's, Inf., Post.)  [x] (22485) Provided manual therapy to mobilize LE, proximal hip and/or LS spine soft tissue/joints for the purpose of modulating pain, promoting relaxation,  increasing ROM, reducing/eliminating soft tissue swelling/inflammation/restriction, improving soft tissue extensibility and allowing for proper ROM for normal function with self care, mobility, lifting and ambulation. Approval Dates:  CPT Code Units Approved Units Used  Date Updated:                     Charges:  Timed Code Treatment Minutes: 33   Total Treatment Minutes: 53      [x] EVAL (LOW) 10606 (typically 20 minutes face-to-face)  [] EVAL (MOD) 69439 (typically 30 minutes face-to-face)  [] EVAL (HIGH) 69609 (typically 45 minutes face-to-face)  [] RE-EVAL     [x] EM(63853) x     [] Dry needle 1 or 2 Muscles (40130)  [] NMR (14947) x     [] Dry needle 3+ Muscles (87459)  [] Manual (89127) x     [] Ultrasound (23461) x  [x] TA (44683) x     [] Mech Traction (15324)  [] ES(attended) (52189)     [] ES (un) (93655):   [x] Vasopump (72838) [] Ionto (68401)   [] Other:    Cezar Milner stated goal: able to ambulate community distances w/o AD. [] Progressing: [] Met: [] Not Met: [] Adjusted    Therapist goals for Patient:   Short Term Goals: To be achieved in: 2 weeks  1.  Independent in HEP and progression per patient tolerance, in order to prevent re-injury. [] Progressing: [] Met: [] Not Met: [] Adjusted  2. Patient will have a decrease in pain to facilitate improvement in movement, function, and ADLs as indicated by Functional Deficits. [] Progressing: [] Met: [] Not Met: [] Adjusted    Long Term Goals: To be achieved in: up to 12 weeks  1. Increase FOTO functional outcome score from 29 to 61 to assist with reaching prior level of function. [] Progressing: [] Met: [] Not Met: [] Adjusted  2. Patient will demonstrate increased AROM to symmetrical to allow for proper joint functioning as indicated by patients Functional Deficits. [] Progressing: [] Met: [] Not Met: [] Adjusted  3. Patient will demonstrate an increase in Strength to good proximal hip strength and control, within 5lb HHD in LE to allow for proper functional mobility as indicated by patients Functional Deficits. [] Progressing: [] Met: [] Not Met: [] Adjusted  4. Patient will return to all functional activities without increased symptoms or restriction. [] Progressing: [] Met: [] Not Met: [] Adjusted  5. Able to work full time w/o knee giving way. [] Progressing: [] Met: [] Not Met: [] Adjusted     ASSESSMENT:  See eval    Treatment/Activity Tolerance:  [x] Patient tolerated treatment well [] Patient limited by fatique  [] Patient limited by pain  [] Patient limited by other medical complications  [] Other:     Overall Progression Towards Functional goals/ Treatment Progress Update:  [] Patient is progressing as expected towards functional goals listed. [] Progression is slowed due to complexities/Impairments listed. [] Progression has been slowed due to co-morbidities.   [x] Plan just implemented, too soon to assess goals progression <30days   [] Goals require adjustment due to lack of progress  [] Patient is not progressing as expected and requires additional follow up with physician  [] Other    Prognosis for POC: [x] Good [] Fair  [] Poor    Patient requires continued skilled intervention: [x] Yes  [] No        PLAN:   [] Continue per plan of care [] Alter current plan (see comments)  [x] Plan of care initiated [] Hold pending MD visit [] Discharge    Electronically signed by: Arthur Oneal, PT  , DPT, [facility] #[id number]        Note: If patient does not return for scheduled/recommended follow up visits, this note will serve as a discharge from care along with the most recent update on progress.

## 2022-10-07 ENCOUNTER — APPOINTMENT (OUTPATIENT)
Dept: GENERAL RADIOLOGY | Age: 27
End: 2022-10-07
Payer: OTHER MISCELLANEOUS

## 2022-10-07 ENCOUNTER — HOSPITAL ENCOUNTER (EMERGENCY)
Age: 27
Discharge: HOME | End: 2022-10-07
Payer: OTHER MISCELLANEOUS

## 2022-10-07 VITALS
HEART RATE: 73 BPM | HEIGHT: 68 IN | DIASTOLIC BLOOD PRESSURE: 97 MMHG | BODY MASS INDEX: 44.41 KG/M2 | TEMPERATURE: 98.1 F | WEIGHT: 293 LBS | RESPIRATION RATE: 18 BRPM | OXYGEN SATURATION: 98 % | SYSTOLIC BLOOD PRESSURE: 156 MMHG

## 2022-10-07 DIAGNOSIS — M79.641 RIGHT HAND PAIN: ICD-10-CM
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
## 2022-10-07 PROCEDURE — 99283 EMERGENCY DEPT VISIT LOW MDM: CPT

## 2022-10-07 PROCEDURE — 73130 X-RAY EXAM OF HAND: CPT

## 2022-10-07 PROCEDURE — 73110 X-RAY EXAM OF WRIST: CPT

## 2022-10-07 ASSESSMENT — PAIN - FUNCTIONAL ASSESSMENT
PAIN_FUNCTIONAL_ASSESSMENT: 0-10
PAIN_FUNCTIONAL_ASSESSMENT: 0-10

## 2022-10-07 ASSESSMENT — PAIN DESCRIPTION - DESCRIPTORS: DESCRIPTORS: ACHING

## 2022-10-07 ASSESSMENT — LIFESTYLE VARIABLES: HOW OFTEN DO YOU HAVE A DRINK CONTAINING ALCOHOL: NEVER

## 2022-10-07 ASSESSMENT — PAIN DESCRIPTION - ORIENTATION
ORIENTATION: RIGHT
ORIENTATION: RIGHT

## 2022-10-07 ASSESSMENT — PAIN DESCRIPTION - LOCATION
LOCATION: HAND
LOCATION: WRIST

## 2022-10-07 ASSESSMENT — PAIN SCALES - GENERAL
PAINLEVEL_OUTOF10: 2
PAINLEVEL_OUTOF10: 4

## 2022-10-07 ASSESSMENT — PAIN DESCRIPTION - PAIN TYPE: TYPE: ACUTE PAIN

## 2022-10-07 ASSESSMENT — PAIN DESCRIPTION - FREQUENCY: FREQUENCY: CONTINUOUS
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
